# Patient Record
Sex: MALE | Race: WHITE | NOT HISPANIC OR LATINO
[De-identification: names, ages, dates, MRNs, and addresses within clinical notes are randomized per-mention and may not be internally consistent; named-entity substitution may affect disease eponyms.]

---

## 2019-03-25 PROBLEM — Z00.00 ENCOUNTER FOR PREVENTIVE HEALTH EXAMINATION: Status: ACTIVE | Noted: 2019-03-25

## 2019-03-26 ENCOUNTER — APPOINTMENT (OUTPATIENT)
Dept: OTOLARYNGOLOGY | Facility: CLINIC | Age: 49
End: 2019-03-26

## 2019-04-18 ENCOUNTER — APPOINTMENT (OUTPATIENT)
Dept: OTOLARYNGOLOGY | Facility: CLINIC | Age: 49
End: 2019-04-18
Payer: COMMERCIAL

## 2019-04-18 VITALS
BODY MASS INDEX: 28.44 KG/M2 | DIASTOLIC BLOOD PRESSURE: 81 MMHG | HEIGHT: 72 IN | WEIGHT: 210 LBS | HEART RATE: 97 BPM | SYSTOLIC BLOOD PRESSURE: 120 MMHG

## 2019-04-18 DIAGNOSIS — F10.10 ALCOHOL ABUSE, UNCOMPLICATED: ICD-10-CM

## 2019-04-18 DIAGNOSIS — J30.2 OTHER SEASONAL ALLERGIC RHINITIS: ICD-10-CM

## 2019-04-18 DIAGNOSIS — Z87.891 PERSONAL HISTORY OF NICOTINE DEPENDENCE: ICD-10-CM

## 2019-04-18 DIAGNOSIS — Z78.9 OTHER SPECIFIED HEALTH STATUS: ICD-10-CM

## 2019-04-18 PROCEDURE — 99213 OFFICE O/P EST LOW 20 MIN: CPT | Mod: 25

## 2019-04-18 PROCEDURE — 31231 NASAL ENDOSCOPY DX: CPT

## 2019-04-18 NOTE — HISTORY OF PRESENT ILLNESS
[de-identified] : ROMA ROSE is a 48 year patient with a history of loss of smell, chronic rhinitis, allergies and sinus surgery.  He took the oral steroids and started the nasal saline rinses with budesonide.  He had improvement with the oral steroids but the smell has decreased since the steroids finished.  He has mild nasal congestion He has no sinus pain or pressure.  He saw the allergist.

## 2019-04-18 NOTE — ASSESSMENT
[FreeTextEntry1] : He has a history of chronic rhinitis, chronic sinusitis, nasal polyps, sinus surgery, and hyposmia. His sense of smell improved with oral steroids but has since decreased. He has polyps on nasal endoscopy.\par \par PLAN\par \par -findings and management options discussed in detail with the patient. \par -Nasal saline rinses with budesonide \par -antihistamine/decongestant as needed\par -Allergy management\par -Followup with the allergist \par -CT scan of the sinuses\par -He may benefit from revision sinus surgery. We discussed possible nasal polypectomy and steroids stent placement in the office.\par -follow up after the CT scan\par -call and return earlier if any concerns. \par

## 2019-04-18 NOTE — CONSULT LETTER
[Courtesy Letter:] : I had the pleasure of seeing your patient, [unfilled], in my office today. [Dear  ___] : Dear  [unfilled], [Please see my note below.] : Please see my note below. [Consult Closing:] : Thank you very much for allowing me to participate in the care of this patient.  If you have any questions, please do not hesitate to contact me. [FreeTextEntry3] : Kindra Murphy MD\par  [Sincerely,] : Sincerely,

## 2019-04-18 NOTE — REASON FOR VISIT
[Subsequent Evaluation] : a subsequent evaluation for [FreeTextEntry2] : loss of smell and congestion

## 2019-05-30 ENCOUNTER — APPOINTMENT (OUTPATIENT)
Dept: OTOLARYNGOLOGY | Facility: CLINIC | Age: 49
End: 2019-05-30
Payer: COMMERCIAL

## 2019-05-30 PROCEDURE — 99213 OFFICE O/P EST LOW 20 MIN: CPT

## 2019-05-30 NOTE — CONSULT LETTER
[Dear  ___] : Dear  [unfilled], [Courtesy Letter:] : I had the pleasure of seeing your patient, [unfilled], in my office today. [Please see my note below.] : Please see my note below. [Consult Closing:] : Thank you very much for allowing me to participate in the care of this patient.  If you have any questions, please do not hesitate to contact me. [Sincerely,] : Sincerely, [FreeTextEntry3] : Kindra Murphy MD\par

## 2019-05-30 NOTE — HISTORY OF PRESENT ILLNESS
[de-identified] : 4/18/19- ROMA ROSE is a 48 year patient with a history of loss of smell, chronic rhinitis, allergies and sinus surgery.  He took the oral steroids and started the nasal saline rinses with budesonide.  He had improvement with the oral steroids but the smell has decreased since the steroids finished.  He has mild nasal congestion He has no sinus pain or pressure.  He saw the allergist.\yokasta NE [FreeTextEntry1] : 5/30/19- Khadar Mcfadden Is here for followup for chronic sinusitis and nasal polyps. He had the CT scan. We reviewed the results. He has no sinus pain or pressure but does have loss of smell and nasal congestion. He is using nasal saline irrigations with budesonide. He started immunotherapy again 2 weeks ago. The CT scan showed evidence of prior sinus surgery. There was opacification of the small frontal sinuses, nasal polyps, ethmoid sinus opacification, complete opacification of the right sphenoid sinus and moderate mucosal thickening of the left sphenoid sinus and mucosal thickening of the maxillary sinuses.

## 2019-05-30 NOTE — ASSESSMENT
[FreeTextEntry1] : He has a history of chronic sinusitis, nasal polyps, hyposmia and sinus surgery. He had the sinus CT scan. We reviewed the results\par \par PLAN\par \par -findings and management options discussed in detail with the patient. \par -continue Nasal saline rinses with budesonide \par -antihistamine/decongestant as needed\par -Allergy management and immunotherapy\par -We discussed revision sinus surgery.  He is interested in in-office image guided endoscopic nasal polypectomy with possible balloon sinuplasty of the sphenoid sinuses and Sinuva steroid stent placement.  I reviewed the procedure and the risks/benefits of the options. The risks were discussed in detail and include but are not limited to anesthesia complications, bleeding, infection, worsening or persistent symptoms, persistent loss of smell, scarring, need for further procedures, CSF leak/skull base injury and visual changes/orbital injury. He will speak with my surgery scheduler about arranging the procedure.  He was given antibiotics and steroids to take prior to the procedure. I will also see him back in one to 2 weeks before. \par -call and return earlier if any concerns. \par

## 2019-07-12 ENCOUNTER — APPOINTMENT (OUTPATIENT)
Dept: OTOLARYNGOLOGY | Facility: CLINIC | Age: 49
End: 2019-07-12
Payer: COMMERCIAL

## 2019-07-12 VITALS
SYSTOLIC BLOOD PRESSURE: 131 MMHG | BODY MASS INDEX: 28.44 KG/M2 | DIASTOLIC BLOOD PRESSURE: 82 MMHG | HEIGHT: 72 IN | HEART RATE: 58 BPM | WEIGHT: 210 LBS

## 2019-07-12 PROCEDURE — 31237 NSL/SINS NDSC SURG BX POLYPC: CPT | Mod: 50

## 2019-07-12 PROCEDURE — 61782 SCAN PROC CRANIAL EXTRA: CPT

## 2019-07-12 PROCEDURE — 30117 REMOVAL OF INTRANASAL LESION: CPT | Mod: LT

## 2019-07-12 PROCEDURE — S1090: CPT | Mod: RT

## 2019-07-12 RX ORDER — AMOXICILLIN AND CLAVULANATE POTASSIUM 875; 125 MG/1; MG/1
875-125 TABLET, COATED ORAL
Qty: 20 | Refills: 0 | Status: DISCONTINUED | COMMUNITY
Start: 2019-05-30 | End: 2019-07-12

## 2019-07-12 RX ORDER — PREDNISONE 10 MG/1
10 TABLET ORAL
Qty: 12 | Refills: 0 | Status: DISCONTINUED | COMMUNITY
Start: 2019-05-30 | End: 2019-07-12

## 2019-07-12 NOTE — CONSULT LETTER
[Dear  ___] : Dear  [unfilled], [Please see my note below.] : Please see my note below. [Courtesy Letter:] : I had the pleasure of seeing your patient, [unfilled], in my office today. [Consult Closing:] : Thank you very much for allowing me to participate in the care of this patient.  If you have any questions, please do not hesitate to contact me. [Sincerely,] : Sincerely, [FreeTextEntry3] : Kindra Murphy MD\par

## 2019-07-12 NOTE — HISTORY OF PRESENT ILLNESS
[de-identified] : ROMA ROSE is a 49 year patient has a history of chronic sinusitis, nasal, sinus surgery. He is here for in-office image guided endoscopic nasal polypectomy with Sinuva stent placement and possible sphenoid sinus  balloon sinuplasty. He has been on antibiotics and steroids. He continues to have nasal congestion and obstruction but no sinus pain or pressure.

## 2019-07-12 NOTE — ASSESSMENT
[FreeTextEntry1] : He tolerated the procedure well. There were no complications.\par \par Postoperative care instructions were given to the patient and reviewed.\par -He will finish the antibiotics.\par -he may use Afrin b.i.d. as needed for severe congestion\par -he may start nasal saline rinses tomorrow\par -He will call me if he has any bleeding or other concerns.\par -I will see him back in one week

## 2019-07-18 ENCOUNTER — APPOINTMENT (OUTPATIENT)
Dept: OTOLARYNGOLOGY | Facility: CLINIC | Age: 49
End: 2019-07-18

## 2019-07-18 ENCOUNTER — APPOINTMENT (OUTPATIENT)
Dept: OTOLARYNGOLOGY | Facility: CLINIC | Age: 49
End: 2019-07-18
Payer: COMMERCIAL

## 2019-07-18 PROCEDURE — 31237 NSL/SINS NDSC SURG BX POLYPC: CPT | Mod: LT,58

## 2019-07-18 NOTE — REASON FOR VISIT
[Post-Operative Visit] : a post-operative visit [FreeTextEntry2] : chronic sinusitis and nasal polyps.

## 2019-07-18 NOTE — HISTORY OF PRESENT ILLNESS
[de-identified] : 4/18/19- ROMA ROSE is a 48 year patient with a history of loss of smell, chronic rhinitis, allergies and sinus surgery.  He took the oral steroids and started the nasal saline rinses with budesonide.  He had improvement with the oral steroids but the smell has decreased since the steroids finished.  He has mild nasal congestion He has no sinus pain or pressure.  He saw the allergist.\yokasta NE [FreeTextEntry1] : 5/30/19- Khadar Mcfadden Is here for followup for chronic sinusitis and nasal polyps. He had the CT scan. We reviewed the results. He has no sinus pain or pressure but does have loss of smell and nasal congestion. He is using nasal saline irrigations with budesonide. He started immunotherapy again 2 weeks ago. The CT scan showed evidence of prior sinus surgery. There was opacification of the small frontal sinuses, nasal polyps, ethmoid sinus opacification, complete opacification of the right sphenoid sinus and moderate mucosal thickening of the left sphenoid sinus and mucosal thickening of the maxillary sinuses. \par \par 7/12/19- nasal polypectomy and steroid stent placement\par \par 7/181/19- Khadar Mcfadden is here for his postop visit. He underwent nasal polypectomy and steroid stent placement last week. He has been doing well. His breathing is better although he has intermittent congestion. His sense of smell has intermittently improved. He has no sinus pain. he is using nasal saline irrigations

## 2019-07-18 NOTE — ASSESSMENT
[FreeTextEntry1] : He is doing well following the procedure. The left middle meatus was debrided. \par \par PLAN\par \par -findings and management options discussed in detail with the patient. \par -continue Nasal saline rinses \par -He may start the nasal saline rinses with budesonide \par -antihistamine/decongestant as needed\par -follow up in one week. \par -call and return earlier if any concerns. \par

## 2019-07-30 ENCOUNTER — APPOINTMENT (OUTPATIENT)
Dept: OTOLARYNGOLOGY | Facility: CLINIC | Age: 49
End: 2019-07-30
Payer: COMMERCIAL

## 2019-07-30 PROCEDURE — 99024 POSTOP FOLLOW-UP VISIT: CPT

## 2019-07-30 PROCEDURE — 31231 NASAL ENDOSCOPY DX: CPT | Mod: 58

## 2019-07-30 RX ORDER — MOMETASONE FUROATE AND FORMOTEROL FUMARATE DIHYDRATE 50; 5 UG/1; UG/1
AEROSOL RESPIRATORY (INHALATION)
Refills: 0 | Status: DISCONTINUED | COMMUNITY
End: 2019-07-30

## 2019-07-30 NOTE — ASSESSMENT
[FreeTextEntry1] : He continues to do well. he has small polyps medial to the middle turbinate and a small non-obstructing polyp in the left middle meatus. the steroid stents are in place. \par \par PLAN\par \par -findings and management options discussed in detail with the patient. \par -nasal saline rinses with budesonide \par -antihistamine/decongestant as needed\par -follow up with allergist\par -follow up in one  month if he continues to do well.  \par -call and return earlier if any concerns. \par

## 2019-07-30 NOTE — HISTORY OF PRESENT ILLNESS
[de-identified] : 4/18/19- ROMA ROSE is a 48 year patient with a history of loss of smell, chronic rhinitis, allergies and sinus surgery.  He took the oral steroids and started the nasal saline rinses with budesonide.  He had improvement with the oral steroids but the smell has decreased since the steroids finished.  He has mild nasal congestion He has no sinus pain or pressure.  He saw the allergist.\yokasta NE [FreeTextEntry1] : 5/30/19- Khadar Mcfadden Is here for followup for chronic sinusitis and nasal polyps. He had the CT scan. We reviewed the results. He has no sinus pain or pressure but does have loss of smell and nasal congestion. He is using nasal saline irrigations with budesonide. He started immunotherapy again 2 weeks ago. The CT scan showed evidence of prior sinus surgery. There was opacification of the small frontal sinuses, nasal polyps, ethmoid sinus opacification, complete opacification of the right sphenoid sinus and moderate mucosal thickening of the left sphenoid sinus and mucosal thickening of the maxillary sinuses. \par \par 7/12/19- nasal polypectomy and steroid stent placement\par \par 7/181/19- Khadar Mcfadden is here for his postop visit. He underwent nasal polypectomy and steroid stent placement last week. He has been doing well. His breathing is better although he has intermittent congestion. His sense of smell has intermittently improved. He has no sinus pain. he is using nasal saline irrigations\par \par 7/30/19- Khadar Mcfadden is here for his second post-op visit. He continues to do well. He is breathing better. he still has decreased smell. He is using nasal saline rinses with budesonide.

## 2019-08-29 ENCOUNTER — APPOINTMENT (OUTPATIENT)
Dept: OTOLARYNGOLOGY | Facility: CLINIC | Age: 49
End: 2019-08-29

## 2019-10-03 ENCOUNTER — APPOINTMENT (OUTPATIENT)
Dept: OTOLARYNGOLOGY | Facility: CLINIC | Age: 49
End: 2019-10-03

## 2019-10-08 ENCOUNTER — APPOINTMENT (OUTPATIENT)
Dept: OTOLARYNGOLOGY | Facility: CLINIC | Age: 49
End: 2019-10-08
Payer: COMMERCIAL

## 2019-10-08 PROCEDURE — 99213 OFFICE O/P EST LOW 20 MIN: CPT | Mod: 25

## 2019-10-08 PROCEDURE — 31237 NSL/SINS NDSC SURG BX POLYPC: CPT | Mod: LT,58

## 2019-10-08 NOTE — ASSESSMENT
[FreeTextEntry1] : He has mild nasal and sinus symptoms. He has small polyps in the right middle meatus and medial to the middle turbinates bilaterally. The remaining Sinuva stent was removed from the left middle meatus. there was no steroid stent on the right side. He did feel better afterwards \par \par PLAN\par \par -findings and management options discussed in detail with the patient. \par -nasal saline rinses with budesonide \par -antihistamine/decongestant as needed\par -follow up with allergist. Continue immunotherapy. He was asked to see if he is a candidate for Dupixent\par -he is going to take a short burst of prednisone\par -follow up in one  month  \par -call and return earlier if any concerns. \par

## 2019-10-08 NOTE — HISTORY OF PRESENT ILLNESS
[de-identified] : 4/18/19- ROMA ROSE is a 48 year patient with a history of loss of smell, chronic rhinitis, allergies and sinus surgery.  He took the oral steroids and started the nasal saline rinses with budesonide.  He had improvement with the oral steroids but the smell has decreased since the steroids finished.  He has mild nasal congestion He has no sinus pain or pressure.  He saw the allergist.\yokasta NE [FreeTextEntry1] : 5/30/19- Khadar Mcfadden Is here for followup for chronic sinusitis and nasal polyps. He had the CT scan. We reviewed the results. He has no sinus pain or pressure but does have loss of smell and nasal congestion. He is using nasal saline irrigations with budesonide. He started immunotherapy again 2 weeks ago. The CT scan showed evidence of prior sinus surgery. There was opacification of the small frontal sinuses, nasal polyps, ethmoid sinus opacification, complete opacification of the right sphenoid sinus and moderate mucosal thickening of the left sphenoid sinus and mucosal thickening of the maxillary sinuses. \par \par 7/12/19- nasal polypectomy and steroid stent placement\par \par 7/181/19- Khadar Mcfadden is here for his postop visit. He underwent nasal polypectomy and steroid stent placement last week. He has been doing well. His breathing is better although he has intermittent congestion. His sense of smell has intermittently improved. He has no sinus pain. he is using nasal saline irrigations\par \par 7/30/19- Khadar Mcfadden is here for his second post-op visit. He continues to do well. He is breathing better. he still has decreased smell. He is using nasal saline rinses with budesonide. \par \par 10/8/19- Khadar Mcfadden is here for follow up for chronic sinusitis, nasal polyps and sinus surgery. He has nasal congestion and obstruction.  He still has loss of smell.  He has no sinus pain or pressure. He has not had a sinus infection. He has an appointment to see the allergist.  He has not been consistent with the immunotherapy. He uses nasal saline rinses with budesonide.

## 2019-12-05 ENCOUNTER — APPOINTMENT (OUTPATIENT)
Dept: OTOLARYNGOLOGY | Facility: CLINIC | Age: 49
End: 2019-12-05

## 2023-01-08 ENCOUNTER — NON-APPOINTMENT (OUTPATIENT)
Age: 53
End: 2023-01-08

## 2023-01-13 ENCOUNTER — APPOINTMENT (OUTPATIENT)
Dept: OTOLARYNGOLOGY | Facility: CLINIC | Age: 53
End: 2023-01-13
Payer: COMMERCIAL

## 2023-01-13 VITALS — BODY MASS INDEX: 27.77 KG/M2 | TEMPERATURE: 98.2 F | HEIGHT: 72 IN | WEIGHT: 205 LBS

## 2023-01-13 DIAGNOSIS — R43.8 OTHER DISTURBANCES OF SMELL AND TASTE: ICD-10-CM

## 2023-01-13 DIAGNOSIS — S02.2XXA FRACTURE OF NASAL BONES, INITIAL ENCOUNTER FOR CLOSED FRACTURE: ICD-10-CM

## 2023-01-13 PROCEDURE — 31231 NASAL ENDOSCOPY DX: CPT

## 2023-01-13 PROCEDURE — 99204 OFFICE O/P NEW MOD 45 MIN: CPT | Mod: 25

## 2023-01-13 RX ORDER — PREDNISONE 10 MG/1
10 TABLET ORAL
Qty: 12 | Refills: 0 | Status: DISCONTINUED | COMMUNITY
Start: 2019-10-08 | End: 2023-01-13

## 2023-01-13 RX ORDER — BUDESONIDE 1 MG/2ML
INHALANT ORAL
Refills: 0 | Status: DISCONTINUED | COMMUNITY
End: 2023-01-13

## 2023-01-13 RX ORDER — BUDESONIDE 0.5 MG/2ML
0.5 INHALANT ORAL
Qty: 2 | Refills: 3 | Status: ACTIVE | COMMUNITY
Start: 2023-01-13 | End: 1900-01-01

## 2023-01-13 RX ORDER — FLUTICASONE FUROATE, UMECLIDINIUM BROMIDE AND VILANTEROL TRIFENATATE 200; 62.5; 25 UG/1; UG/1; UG/1
POWDER RESPIRATORY (INHALATION)
Refills: 0 | Status: ACTIVE | COMMUNITY

## 2023-01-13 NOTE — ASSESSMENT
[FreeTextEntry1] : He had nasal trauma with a nasal fracture on December 24.  He is not sure that his nose looks much different although he does palpate a step-off on the left nasal bone.  He still has mild tenderness and swelling.  On exam, the nasal bones did not appear significantly mobile.\par \par He has a history of chronic rhinosinusitis, nasal polyps, sinus surgery.  He has had regrowth of the polyps.  He has not been using intranasal steroids\par \par Plan\par -Findings and management options were discussed with the patient.\par -I recommended he restart the nasal saline irrigations with budesonide.\par -Allergy and sinus medications as needed\par -I recommend he follow-up with the allergist.  He could be a candidate for Dupixent\par -I am placing him on a burst of prednisone.  He has taken in the past without any problems.  I am also placing him on a course antibiotics as he does have drainage on the left side.\par -Consider in office nasal polypectomy again depending on how he does\par -We discussed management of the nasal fracture.  We could attempt a closed reduction of nasal fracture although it has been 3 weeks since the injury.  Otherwise, he may consider rhinoplasty/open reduction in the future.  He decided to hold off on attempted closed reduction today.\par -I will see him back in 2 weeks\par -He should call return earlier if he has any concerns or worsening symptoms

## 2023-01-13 NOTE — HISTORY OF PRESENT ILLNESS
[de-identified] : ROMA ROSE is a 52 year old patient with a history of chronic rhinosinusitis, nasal polyps, and sinus surgery here for nasal trauma.  He said that he was walking a dog on [address] when he hit his nose.  He had pain, swelling, and bleeding.  He still feels like it is mildly swollen.  He feels a step-off in the bone but does not think the nose looks that much different.  He did go to urgent care where he was diagnosed with a fracture.  He also has nasal congestion, nasal obstruction, and intermittent loss of smell.  He is no longer using the nasal saline irrigations with budesonide or a nasal steroid spray.  He is seeing the allergist.  He is using Astelin.\par \par ENT history\par He has a history of sinus surgery\par He had an office nasal polypectomy with steroid stent placement in July 2019\par He has a history allergies and has undergone immunotherapy

## 2023-02-23 ENCOUNTER — TRANSCRIPTION ENCOUNTER (OUTPATIENT)
Age: 53
End: 2023-02-23

## 2023-02-23 ENCOUNTER — APPOINTMENT (OUTPATIENT)
Dept: OTOLARYNGOLOGY | Facility: CLINIC | Age: 53
End: 2023-02-23
Payer: COMMERCIAL

## 2023-02-23 VITALS — BODY MASS INDEX: 27.77 KG/M2 | HEIGHT: 72 IN | WEIGHT: 205 LBS

## 2023-02-23 DIAGNOSIS — J32.4 CHRONIC PANSINUSITIS: ICD-10-CM

## 2023-02-23 DIAGNOSIS — J33.9 NASAL POLYP, UNSPECIFIED: ICD-10-CM

## 2023-02-23 DIAGNOSIS — J31.0 CHRONIC RHINITIS: ICD-10-CM

## 2023-02-23 PROCEDURE — 99213 OFFICE O/P EST LOW 20 MIN: CPT

## 2023-02-23 RX ORDER — PREDNISONE 10 MG/1
10 TABLET ORAL
Qty: 30 | Refills: 0 | Status: COMPLETED | COMMUNITY
Start: 2023-01-13 | End: 2023-02-23

## 2023-02-23 RX ORDER — CEFUROXIME AXETIL 250 MG/1
250 TABLET ORAL
Qty: 20 | Refills: 0 | Status: COMPLETED | COMMUNITY
Start: 2023-01-13 | End: 2023-02-23

## 2023-02-23 RX ORDER — PREDNISONE 10 MG/1
10 TABLET ORAL
Qty: 30 | Refills: 0 | Status: ACTIVE | COMMUNITY
Start: 2023-02-23 | End: 1900-01-01

## 2023-02-23 NOTE — HISTORY OF PRESENT ILLNESS
[de-identified] : ROMA ROSE is a 52 year old patient here for follow-up.  I placed him on steroids and antibiotics at his last visit for an infection.  He does feel better.  The nasal obstruction and sense of smell have improved.  He still has some nasal congestion.  He is using nasal saline irrigations with budesonide.\par \par ENT history\par He has a history of sinus surgery\par He had an office nasal polypectomy with steroid stent placement in July 2019\par He has a history allergies and has undergone immunotherapy \par He had nasal trauma in December.  He was diagnosed with a fracture.

## 2023-02-23 NOTE — ASSESSMENT
[FreeTextEntry1] : He has a history of chronic rhinosinusitis, nasal polyps, sinus surgery.  He has had some improvement after the antibiotics and steroids.  There is no drainage.  He still had small polyps bilaterally.\par \par Plan\par -Findings and management options were discussed with the patient.\par -Continue the nasal saline irrigations with budesonide\par -Allergy and sinus medications as needed\par -I am placing him on 1 more burst of steroids\par -I again recommended follow-up with his allergist.  He may be a candidate for Dupixent\par -I spoke with him about repeating the CT scan and considering in office nasal polypectomy versus revision sinus surgery.  He is going to see the allergist first\par -I will see him back either in 1 month or after he sees the allergist.\par -He should call return earlier if he has any concerns or worsening symptoms

## 2023-02-23 NOTE — REASON FOR VISIT
[Subsequent Evaluation] : a subsequent evaluation for [FreeTextEntry2] : Nasal polyps and chronic sinusitis

## 2023-03-23 ENCOUNTER — APPOINTMENT (OUTPATIENT)
Dept: OTOLARYNGOLOGY | Facility: CLINIC | Age: 53
End: 2023-03-23

## 2023-09-21 ENCOUNTER — APPOINTMENT (OUTPATIENT)
Dept: OTOLARYNGOLOGY | Facility: CLINIC | Age: 53
End: 2023-09-21

## 2024-10-02 ENCOUNTER — NON-APPOINTMENT (OUTPATIENT)
Age: 54
End: 2024-10-02

## 2024-10-04 ENCOUNTER — APPOINTMENT (OUTPATIENT)
Dept: OTOLARYNGOLOGY | Facility: CLINIC | Age: 54
End: 2024-10-04
Payer: COMMERCIAL

## 2024-10-04 VITALS — WEIGHT: 195 LBS | HEIGHT: 72 IN | BODY MASS INDEX: 26.41 KG/M2

## 2024-10-04 DIAGNOSIS — R43.8 OTHER DISTURBANCES OF SMELL AND TASTE: ICD-10-CM

## 2024-10-04 DIAGNOSIS — J32.4 CHRONIC PANSINUSITIS: ICD-10-CM

## 2024-10-04 DIAGNOSIS — J33.9 NASAL POLYP, UNSPECIFIED: ICD-10-CM

## 2024-10-04 DIAGNOSIS — J31.0 CHRONIC RHINITIS: ICD-10-CM

## 2024-10-04 PROCEDURE — 99213 OFFICE O/P EST LOW 20 MIN: CPT | Mod: 25

## 2024-10-04 PROCEDURE — 31231 NASAL ENDOSCOPY DX: CPT

## 2024-10-04 RX ORDER — PREDNISONE 10 MG/1
10 TABLET ORAL
Qty: 30 | Refills: 0 | Status: ACTIVE | COMMUNITY
Start: 2024-10-04 | End: 1900-01-01

## 2024-10-04 NOTE — PHYSICAL EXAM
[TextEntry] : General: The patient was alert, oriented and in no distress. Voice was clear.  Face: The patient had no facial asymmetry or mass. The skin was unremarkable.  Ears: Hearing normal to conversational voice External ears were normal without deformity. Ear canals were clear. No cerumen or inflammation Tympanic membranes were intact and normal. No perforation or effusion. mobile  Nose: External nose-no significant deformity.  On anterior rhinoscopy, the nasal mucosa was clear.  The anterior septum was grossly midline. There were no visualized polyps, purulence  or masses.  Oral cavity: Oral mucosa- normal. Oral and base of tongue- clear and without mass. Gingival and buccal mucosa- moist and without lesions. Palate- the palate moved well. There was no cleft palate. There appeared to be good salivary flow.  Oral cavity/oropharynx- no pus, erythema or mass  Neck: The neck was symmetrical. The parotid and submandibular glands were normal without masses. The trachea was midline and there was no unusual crepitus. Thyroid was smooth and nontender and no masses were palpated. No masses  Lymphatics: Cervical adenopathy- none.    Procedure   PROCEDURE:  NASAL ENDOSCOPY  Surgeon: Dr. Murphy Indication: Chronic sinusitis, nasal polyps, sinus surgery, inadequate exam on anterior rhinoscopy Anesthetic: Topical lidocaine and Afrin Procedure: The patient was placed in a sitting position.  Following application of the topical anesthetic and decongestant, exam was performed with a flexible telescope.  The scope was passed along the right nasal floor to the nasopharynx.  It was then passed into the region of the middle meatus, middle turbinate, and sphenoethmoid region.  An identical procedure was performed on the left side.  The following findings were noted:  Nasal mucosa: Mild edema Septum: Grossly midline  Right nasal cavity: There were  polyps medial to the middle turbinate.  The nasal cavity was obstructed by polyps      Inferior turbinate: Mildly enlarged      Middle turbinate: normal      Superior turbinate: normal      Inferior meatus: no pus, polyps or congestion      Middle meatus: Postsurgical changes.  The sinus cavities were not visualized.  Polyps present.  No drainage      Superior meatus: Polyps      Sphenoethmoidal polyps  Left nasal cavity: polyps medial to the middle turbinate.  The nasal cavity was obstructed superiorly by nasal polyps      Inferior turbinate: Mildly enlarged      Middle turbinate: normal      Superior turbinate: normal      Inferior meatus: no pus, polyps or congestion      Middle meatus: Postsurgical changes.  The sinus cavities were not visualized.  No purulent drainage      Superior meatus: Polyps      Sphenoethmoidal recess: Polyps  Nasopharynx: no masses, choanae patent, no adenoid tissue

## 2024-10-04 NOTE — REVIEW OF SYSTEMS
----- Message from Emily Romero sent at 7/21/2022 10:43 AM CDT -----  Regarding: Lab Orders  This patients wife called the Radiology Scheduling desk regarding questions she had about his lab orders. Please call this patient and explain what  Dr. Maxwell wants her to do. She is 89 years old and doesn't understand.    Radiology Scheduling     [Patient Intake Form Reviewed] : Patient intake form was reviewed

## 2024-10-04 NOTE — CONSULT LETTER
[Dear  ___] : Dear  [unfilled], [Courtesy Letter:] : I had the pleasure of seeing your patient, [unfilled], in my office today. [Please see my note below.] : Please see my note below. [Consult Closing:] : Thank you very much for allowing me to participate in the care of this patient.  If you have any questions, please do not hesitate to contact me. [Sincerely,] : Sincerely, [FreeTextEntry3] : Kindra Murphy MD [facility] at [facility] Ot[facility]aryngology  Head & Neck Surgery [facility]   Department of Otolaryngology [facility] at \[facility]\""/[facility]  Office Tel: [phone]

## 2024-10-04 NOTE — ASSESSMENT
[FreeTextEntry1] : He has a history of chronic rhinosinusitis, allergies, nasal polyps, and sinus surgery.  He has not been here for some time.  He has had enlargement of the nasal polyps.  The sinus cavities were obstructed.  There is also obstruction nasal cavity.  There is no obvious infection  Plan -Findings and management options were discussed with the patient. - I recommended he restart the nasal saline irrigations with budesonide.  I have asked him to call me if he needs a prescription - Allergy medications as needed - I am going to give him a course of oral steroids.  He has tolerated them in the past.  He is going to check with the allergist to ensure it is okay for him to take them while undergoing repeat evaluation. - He may benefit from Dupixent.  We had discussed this in the past.  He will speak with the allergist about this - Depending on he does, consider repeat CT scan.  He may benefit from revision surgery. - Follow-up in approximately 3 weeks - Call and return earlier if any problems or worsening symptoms

## 2024-10-04 NOTE — HISTORY OF PRESENT ILLNESS
[de-identified] : ROMA ROSE is a 54 year old patient with a history of chronic rhinosinusitis, nasal polyps, and sinus surgery here for follow up. He was last seen about a year and a half ago. He recently went back to his allergist to start immunotherapy and repeat skin testing. He has discussed Dupixent with the allergist and is considering it. His sense of smell is diminished. He has been using an antihistamine spray which helps. He still has some nasal congestion.  He had stopped using the budesonide nasal rinses as he did not feel they were helping him.   ENT history He has a history of sinus surgery in [place] in 2010.  He had an office nasal polypectomy with steroid stent placement in July 2019 He has a history allergies and has undergone immunotherapy  He had nasal trauma in December 2022.  He was diagnosed with a fracture.

## 2024-10-30 ENCOUNTER — APPOINTMENT (OUTPATIENT)
Dept: OTOLARYNGOLOGY | Facility: CLINIC | Age: 54
End: 2024-10-30

## 2024-12-03 ENCOUNTER — APPOINTMENT (OUTPATIENT)
Dept: OTOLARYNGOLOGY | Facility: CLINIC | Age: 54
End: 2024-12-03

## 2025-02-10 ENCOUNTER — APPOINTMENT (OUTPATIENT)
Dept: OTOLARYNGOLOGY | Facility: CLINIC | Age: 55
End: 2025-02-10